# Patient Record
Sex: MALE | Race: WHITE
[De-identification: names, ages, dates, MRNs, and addresses within clinical notes are randomized per-mention and may not be internally consistent; named-entity substitution may affect disease eponyms.]

---

## 2018-01-26 ENCOUNTER — HOSPITAL ENCOUNTER (EMERGENCY)
Dept: HOSPITAL 45 - C.EDB | Age: 23
Discharge: HOME | End: 2018-01-26
Payer: COMMERCIAL

## 2018-01-26 VITALS
TEMPERATURE: 98.42 F | HEART RATE: 84 BPM | DIASTOLIC BLOOD PRESSURE: 69 MMHG | SYSTOLIC BLOOD PRESSURE: 121 MMHG | OXYGEN SATURATION: 97 %

## 2018-01-26 VITALS
HEIGHT: 72.99 IN | WEIGHT: 235.01 LBS | WEIGHT: 235.01 LBS | BODY MASS INDEX: 31.15 KG/M2 | BODY MASS INDEX: 31.15 KG/M2 | HEIGHT: 72.99 IN

## 2018-01-26 DIAGNOSIS — L05.01: Primary | ICD-10-CM

## 2018-01-26 DIAGNOSIS — F32.9: ICD-10-CM

## 2018-01-26 DIAGNOSIS — F41.9: ICD-10-CM

## 2018-01-26 DIAGNOSIS — F12.90: ICD-10-CM

## 2018-01-26 NOTE — EMERGENCY ROOM VISIT NOTE
ED Visit Note


First contact with patient:  14:57


CHIEF COMPLAINT: "I have an abscess right above my ass crack"





HISTORY OF PRESENT ILLNESS:  This 22-year-old male patient presents to the 

emergency department "a few weeks" after they noticed a hard, red, tender area 

"right above my ass crack".  It is slowly getting larger, more painful and 

tender.  The patient states he had the abscess drained approximately 6 months 

ago.  He states a few weeks ago, he noticed it worsening.  He states it has 

been "exponentially growing, and bilateral mean it is increasing in size daily"

.  No fever, chills, or loss of appetite.  There has been no drainage from the 

area.  There was no injury to the area preceding the infection.  They rate the 

pain as minimal and 3/10.  Tetanus shot is up to date.  They have tried 

nothing.  The patient is not diabetic. The patient has history of subcutaneous 

abscesses in the same area.





REVIEW OF SYSTEMS:     A 10 system review of systems was performed with 

positives and pertinent negatives listed in the history of present illness. All 

other systems were reviewed and are negative. 





ALLERGIES: None





MEDICATIONS: None





PMH: None





SOCIAL HISTORY: The patient admits to smoking marijuana.  He denies alcohol or 

tobacco use.  He lives locally with family.





PHYSICAL EXAM: Vital Signs: Reviewed Nurse's notes, vital signs stable.  GENERAL

: This is a 22-year-old white male, no acute distress, non toxic in appearance, 

well-developed well-nourished.  SKIN: There is an erythematous indurated area 

in the sacrococcygeal area, just superior to the buttocks which measures about 

2 cm in diameter.  It is fluctuant but there is no pointing or drainage.  There 

is a zone of inflammation around it but no lymphangitis.  Capillary refill less 

than 2 seconds.  MUSCULOSKELETAL:  There is no limitation of the range of 

motion of the low back.





EMERGENCY DEPARTMENT COURSE:  I examined the patient.  Verbal consent was 

obtained to perform the procedure.  The procedure was performed by our PA 

student under my direct supervision. After saline and Betadine cleansing and 8 

mL of 1% buffered lidocaine anesthesia, the abscess was incised with a number 

11 scalpel blade.  A large amount of purulent material was released with more 

expressed by pressure.  A swab was obtained for culture.  The abscess cavity 

was further probed with a needle  and the deep pocket expressed. The 

abscess cavity was then copiously irrigated with sterile saline under pressure.

  The area was then packed with bacitracin soaked packing.  The area was 

cleaned with sterile saline and dressed with bacitracin and a bulky bandage.  

The patient tolerated the procedure well.  The patient was discharged home in 

stable condition.   


 


I attest that I have personally reviewed the patient's current medication list. 


Patient was found to have normal blood pressure on screening and does not 

require follow-up. 





DIFFERENTIAL DIAGNOSIS: abscess, cellulitis, pilonidal cyst, malignancy, and 

others





DIAGNOSIS:  Pilonidal abscess


Problem List


Medical Problems:


(1) Anxiety


Status: Chronic  





(2) Depression


Status: Chronic  











Current/Historical Medications


Scheduled PRN


Zolpidem Tartrate (Zolpidem Tartrate), 5 MG PO HS PRN for Sleep





Allergies


Coded Allergies:  


     No Known Allergies (Unverified , 11/3/17)





Vital Signs











  Date Time  Temp Pulse Resp B/P (MAP) Pulse Ox O2 Delivery O2 Flow Rate FiO2


 


1/26/18 14:51 36.9 84 18 121/69 97 Room Air  











Medications Administered











 Medications


  (Trade)  Dose


 Ordered  Sig/Bushra


 Route  Start Time


 Stop Time Status Last Admin


Dose Admin


 


 Ketorolac


 Tromethamine


  (Toradol Inj)  60 mg  NOW  STAT


 IM  1/26/18 16:01


 1/26/18 16:02 DC 1/26/18 16:01


60 MG











Departure Information


Impression





 Primary Impression:  


 Pilonidal cyst with abscess





Dispostion


Home / Self-Care





Condition


GOOD





Referrals


Nanci Isidro PA-C (PCP)








Claudio Holguin M.D.





Patient Instructions


ED Abscess IandD, ED Ann Anal Abscess IandD, My [facility]





Additional Instructions





You were seen in the emergency department today for a pilonidal abscess.  This 

was successfully incised and drained.





Keep the area clean and dry.  There is packing in place in the wound.  Do not 

remove the packing.  Please avoid getting the area wet.  The wound covered with 

a dry bandage.





Ibuprofen(Motrin, Advil) may be used for fever or pain.  Use 600mg every six 

hours as needed.  Take with food.  Avoid using more than 2400mg in a 24 hour 

period.  Do not use 2400mg per day for more than three consecutive days without 

physician direction.  Prolonged inappropriate use can lead to stomach upset or 

ulcers. 


(AND/OR)


Acetaminophen(Tylenol) may be used for fever or pain.  Use 1000mg every six 

hours as needed.  Avoid using more than 3000mg in a 24 hour period.  





Return to the emergency department for worsening redness, swelling, significant 

purulent drainage, fever, chills, nausea, vomiting, or significantly worsening 

pain.





Follow-up in 48 hours for packing removal and wound recheck.  This follow-up 

can be at the emergency Department, urgent care, or your primary care provider.





Please follow up with your primary care provider for ongoing management.  As 

discussed, I do recommend follow-up with the general surgeon.

## 2018-01-29 NOTE — PHARMACY PROGRESS NOTE
ED Pharmacist Culture FollowUp


Date of Service:


Jan 29, 2018.


Patient was seen on 1/26/18 for abscess. Patient had I&D at visit and cultures 

obtained. Cultures grew Group B Beta Strep resistant to erythromycin, 

clindamycin, and azithromycin. Patient was not sent with abx and was to follow 

up in 48 hours w/ pcp or ED/urgent care/PCP for removal of packing and wound 

recheck. Discussed with Dr. Hung and no treatment necessary if improved from 

I&D. Called patient ~1500 to see if patient had followed up and had packing 

removed as patient was not seen in the ED. Patient stated they removed the 

packing themselves. Asked if he thought it was improved, stated it was "red and 

bumpy" and he would probably seek more treatment.  I was not able to continue 

the conversation as patient abruptly ended the telephone call stating he was 

really busy and had to go immediately following statement.

## 2018-03-31 ENCOUNTER — HOSPITAL ENCOUNTER (EMERGENCY)
Dept: HOSPITAL 45 - C.EDB | Age: 23
Discharge: HOME | End: 2018-03-31
Payer: COMMERCIAL

## 2018-03-31 VITALS
WEIGHT: 216.93 LBS | BODY MASS INDEX: 28.75 KG/M2 | HEIGHT: 72.99 IN | BODY MASS INDEX: 28.75 KG/M2 | HEIGHT: 72.99 IN | WEIGHT: 216.93 LBS

## 2018-03-31 VITALS — TEMPERATURE: 98.6 F

## 2018-03-31 VITALS — HEART RATE: 75 BPM | DIASTOLIC BLOOD PRESSURE: 59 MMHG | OXYGEN SATURATION: 97 % | SYSTOLIC BLOOD PRESSURE: 111 MMHG

## 2018-03-31 DIAGNOSIS — L05.01: Primary | ICD-10-CM

## 2018-03-31 DIAGNOSIS — F17.210: ICD-10-CM

## 2018-03-31 DIAGNOSIS — S60.221A: ICD-10-CM

## 2018-03-31 DIAGNOSIS — Z86.59: ICD-10-CM
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
## 2018-03-31 NOTE — EMERGENCY ROOM VISIT NOTE
ED Visit Note


First contact with patient:  17:04


CHIEF COMPLAINT:  "There's an abscess on my low back and my right hand hurts."





HISTORY OF PRESENT ILLNESS:  This 22-year-old male patient presents to the 

emergency department, ambulatory, approximately 1 week after they noticed a hard

, red, tender area increasing in size over the tailbone.  The patient does have 

a history of a pilonidal abscess, and states this has been drained 2-3 times 

previously.  It is slowly getting larger, more painful and tender over the past 

week.  No fever, chills, or loss of appetite.  There has been no drainage from 

the area.  There was no injury to the area preceding the infection.  They rate 

the pain as severe and 10/10.  Tetanus shot is up to date.  They have tried no 

OTC remedies.  The patient has not followed up with surgery as previously 

recommended.  The patient is not diabetic. 





The patient also complains of right hand pain which began last night when he 

punched a garbage can.  He states he is having extreme pain over the 4 

metacarpals and digits #2 through 5.  The patient rates the pain as sharp and 10

/10. The patient denies any numbness or tingling. The patient does not have 

injuries to the wrist. The patient has not had a previous fracture to this 

hand.  He states he is unable to  objects or make a claw.  He states the 

injury occurred late last night, and states he just woke up and decided to come 

be evaluated.








REVIEW OF SYSTEMS:     A 10 system review of systems was performed with 

positives and pertinent negatives listed in the history of present illness. All 

other systems were reviewed and are negative. 





ALLERGIES: None





MEDICATIONS: None





PMH: None





SOCIAL HISTORY: The patient lives locally with family.  He denies drug, alcohol 

use.  He admits to smoking 1 pack of cigarettes per day.





PHYSICAL EXAM: Vital Signs: Reviewed Nurse's notes, vital signs stable.  GENERAL

: This is a 22-year-old white male, no acute distress, non toxic in appearance, 

well-developed well-nourished.  SKIN: There is an erythematous indurated area 

in the sacrococcygeal area, just superior to the buttocks which measures about 

2 cm in diameter.  It is fluctuant but there is no pointing or drainage.  There 

is a zone of inflammation around it but no lymphangitis.  Capillary refill less 

than 2 seconds.  MUSCULOSKELETAL:  There is no limitation of the range of 

motion of the low back or lower extremities. There is no obvious deformity of 

the right hand. There is tenderness over the metacarpals and phalanges of 

digits #2 through 5. There is no thenar or hypothenar eminence atrophy.  

Limited thumb opposition to all fingers.  strength 2/5. There is no 

laceration. Capillary refill less than 2 seconds. No tenderness of the fingers 

or wrist. Full range of motion of the wrist. No snuff box tenderness. Radial 

pulse 2+.





RADIOLOGY:


RIGHT HAND 3 VIEWS





CLINICAL HISTORY: Right hand pain. Punching injury.





FINDINGS: 3 views of the right hand are obtained. No prior studies are available


for comparison at the time of dictation. The skeletal structures are well


mineralized. There is minimal angulation of the distal fifth metacarpal with no


acute fracture clearly identified. No additional findings are concerning for


acute fracture. The joint spaces of the hand are well maintained. Dorsal soft


tissue edema is noted.





IMPRESSION:





1. There is mild angulation of the distal fifth metacarpal with no fracture line


clearly identified. This is likely related to age indeterminant posttraumatic


change. Correlate for point tenderness.





2. No additional findings are concerning for acute fracture.





3. Significant dorsal soft tissue edema is observed.











Electronically signed by:  Jacques Rodriguez M.D.


3/31/2018 6:26 PM





Dictated Date/Time:  3/31/2018 6:23 PM





EMERGENCY DEPARTMENT COURSE:  I examined the patient.  X-ray of the right hand 

was performed and reviewed by myself and radiologist as above.  There is 

concerned due to the mechanism and abnormality noted in the fifth metacarpal 

for possible boxer's fracture, and I did recommend Ortho-Glass splint or at 

least a removable splint.  The patient declines these treatments despite 

discussion regarding the risks associated with refusing this treatment.  I did 

encourage close orthopedic follow-up outpatient, and the patient declines this 

as well.  He will be provided with the contact information for orthopedics, and 

was encouraged to contact them for follow-up on Monday.  I did offer to have 

our  schedule the patient an appointment and call him on Monday, 

and the patient declines this as well.





I recommended against incision and drainage again here in the emergency 

department without outpatient follow-up of the pilonidal abscess. I did 

recommend antibiotic treatment at this time with surgery referral outpatient.  

The patient declines and insists that the abscess must be opened and drained 

again.  He is agitated that the abscess continues to return, but I discussed 

with him the importance of follow-up outpatient for final management when there 

is no present infection.  The patient verbalizes understanding, but states he 

is unsure that he will be able to afford the appointments and states he does 

not have time to schedule appointments.





Verbal consent was obtained to perform the procedure.  After saline and 

Betadine cleansing and 4 mL of 1% lidocaine with epinephrine anesthesia, the 

abscess was incised with a number 11 scalpel blade.  A large amount of purulent 

material was released with more expressed by pressure.  The patient declined 

culture.  The abscess cavity was further probed with a needle  and the 

deep pocket expressed. The abscess cavity was then copiously irrigated with 

sterile saline under pressure.  The area was then packed with 1/4 in. packing.  

The area was cleaned with sterile saline and dressed with bacitracin and a 

bulky bandage.  The patient tolerated the procedure well. 





The patient is very concerned regarding cost of his care here in the emergency 

department.  He states he is unable to afford certain tests and treatments due 

to the associated cost.  I did discuss with the patient that I am unaware of 

specific costs of procedures, testing, and treatment, but did discuss with him 

the importance of proper management and care.  The nursing supervisor, Elaine, 

did have a conversation with the patient, and states he was agreeable to workup 

recommended here in the emergency department.  I did discuss with the patient 

the importance of proper outpatient care and did discuss with him multiple 

times that he will be offered and given appropriate treatment for his 

complaints despite the potential costs.





The patient was given his first dose of Augmentin.  I did offer a home pack of 

the medication, the patient declines.  A prescription was sent to the pharmacy.

  Discharge instructions reviewed, the patient was discharged home in good 

condition.


 





I attest that I have personally reviewed the patient's current medication list. 


Patient was found to have normal blood pressure on screening and does not 

require follow-up. 





Etiologies such as soft tissue injury, fracture, dislocation, neurovascular 

compromise, compartment syndrome, abscess, cellulitis, pilonidal cyst, 

malignancy, as well as others were entertained.  








DIAGNOSIS:  Pilonidal cyst with abscess, right hand contusion


Problem List


Medical Problems:


(1) Anxiety


Status: Chronic  





(2) Depression


Status: Chronic  











Current/Historical Medications


Scheduled


Amoxicillin & Pot Clavulanate (Augmentin 875-125 mg), 1 TAB PO BID


Dextromethorphan-Phenylephrine (Vicks Dayquil Cold & Flu), 1 DOSE PO PRN UD





Allergies


Coded Allergies:  


     No Known Allergies (Unverified , 11/3/17)





Vital Signs











  Date Time  Temp Pulse Resp B/P (MAP) Pulse Ox O2 Delivery O2 Flow Rate FiO2


 


3/31/18 19:38  75 18 111/59 97   


 


3/31/18 18:45  80 20 104/70 97 Room Air  


 


3/31/18 16:55 37.0 97 18 127/78 96 Room Air  











Medications Administered











 Medications


  (Trade)  Dose


 Ordered  Sig/Bushra


 Route  Start Time


 Stop Time Status Last Admin


Dose Admin


 


 Amoxicillin/


 Clavulanate


 Potassium


  (Augmentin Tab)  875 mg  NOW  STAT


 PO  3/31/18 17:40


 3/31/18 17:44 DC 3/31/18 17:54


875 MG


 


 Lidocaine/


 Epinephrine


  (Xylocaine/Epine


 1% Inj)  20 ml  NOW  STAT


 INFIL  3/31/18 17:40


 3/31/18 17:44 DC 3/31/18 17:54


20 ML











Departure Information


Impression





 Primary Impression:  


 Pilonidal cyst with abscess


 Additional Impression:  


 Contusion of right hand





Dispostion


Home / Self-Care





Condition


GOOD





Prescriptions





Amoxicillin & Pot Clavulanate (Augmentin 875-125 mg) 1 Tab Tab


1 TAB PO BID for 10 Days, #20 TAB


   Prov: Marie Gonzalez, KURTIS         3/31/18





Referrals


No Doctor, Assigned (PCP)








Minh Santiago M.D., John C., DO





Patient Instructions


ED Cyst Pilonidal Infected IandD, ED Fx Boxer, My [facility]





Additional Instructions





ORTHOPEDIC INSTRUCTIONS:


You were seen in the emergency department today for right hand pain and 

swelling.  There was concern for a possible fifth metacarpal fracture, AKA boxer

's fracture.  I did recommend a splint to immobilize the hand, but you did 

decline.  It is imperative that you follow-up with orthopedics in 2-3 days for 

reevaluation.  I do recommend not using the hand and immobilizing the joints 

until you are seen and evaluated by orthopedics and there is verification of no 

fracture.  As discussed, some fractures do not show up immediately on x-ray.





Ibuprofen(Motrin, Advil) may be used for fever or pain.  Use 600mg every six 

hours as needed.  Take with food.  Avoid using more than 2400mg in a 24 hour 

period.  Do not use 2400mg per day for more than three consecutive days without 

physician direction.  Prolonged inappropriate use can lead to stomach upset or 

ulcers.  


(AND/OR)


Acetaminophen(Tylenol) may be used for fever or pain.  Use 1000mg every six 

hours as needed.  Avoid using more than 3000mg in a 24 hour period.  





Ice compresses for 20 minutes at a time four times daily for 2-3 days.





Rest and elevate your injury.





You may consider an Ace wrap for compression and mild immobilization of the 

hand.





Return to the ER immediately for any numbness, tingling, severe pain, extreme 

swelling, discoloration, or other abnormalities in the extremity or as needed.





Call [facility], [phone], on Monday to arrange follow up for 

your injury.





Follow-up with your primary care physician in 2 to 3 days for a recheck of your 

current condition.  








ABSCESS INSTRUCTIONS:


You were seen in the Emergency Department for Incision and Drainage of the 

pilonidal abscess for the third time. You will NEED to return to the Emergency 

Department or see the general surgeon to have the packing removed/changed in 48 

hours. This packing is NOT dissolvable and WILL need to be removed by a health 

care provider. Try to leave the packing in place until you return to the 

Emergency Department.





You were prescribed Augmentin to be taken twice daily for 10 days. This 

medication is an antibiotic. Stop these medications and contact a medical 

provider if you were to develop any significant adverse side effects including: 

wheezing, shortness of breath, passing out, vomiting, or a diffuse rash. Always 

take antibiotics as directed and COMPLETE the ENTIRE course regardless of the 

improvement of your symptoms.





Proper wound care is essential for adequate wound healing and infection 

prevention. You can shower and clean the wound with soap and water. Do not 

scour over the wound. Pat dry with a towel. Do not submerse the wound (i.e. 

bathe or dish wash) until the sutures have been removed. You can use an 

antibiotic ointment with a dressing over the wound for the next 3-4 days. After 

this time you may leave the wound dry and open to the air. If crust develops 

over the wound you can use a Q-tip to apply a 1:1 peroxide:water solution to 

clean the wound.





Look for signs of infection of the wound including: increased pain, swelling, 

foul discharge, streaking, or increased temperature. If any of these are 

noticed you should return to the Emergency Department for further assessment 

and treatment.





As with any laceration you may have received nerve damage to the surrounding 

tissues. This damage may or may not be permanent.





IT IS IMPERATIVE THAT YOU FOLLOW-UP WITH THE GENERAL SURGEON NEXT WEEK FOR 

FURTHER MANAGEMENT OF THE ABSCESS AND FINALIZED TREATMENT.





Contact Dr. Santiago's office at the number provided ON MONDAY MORNING. If you are 

sent to a switchboard during the call, ask for the GENERAL SURGEON'S OFFICE.





Return to the emergency department if your symptoms worsen despite treatment 

course outlined above.





Problem Qualifiers








 Additional Impression:  


 Contusion of right hand


 Encounter type:  initial encounter  Qualified Codes:  S60.221A - Contusion of 

right hand, initial encounter

## 2018-03-31 NOTE — DIAGNOSTIC IMAGING REPORT
RIGHT HAND 3 VIEWS



CLINICAL HISTORY: Right hand pain. Punching injury.



FINDINGS: 3 views of the right hand are obtained. No prior studies are available

for comparison at the time of dictation. The skeletal structures are well

mineralized. There is minimal angulation of the distal fifth metacarpal with no

acute fracture clearly identified. No additional findings are concerning for

acute fracture. The joint spaces of the hand are well maintained. Dorsal soft

tissue edema is noted.



IMPRESSION:



1. There is mild angulation of the distal fifth metacarpal with no fracture line

clearly identified. This is likely related to age indeterminant posttraumatic

change. Correlate for point tenderness.



2. No additional findings are concerning for acute fracture.



3. Significant dorsal soft tissue edema is observed.







Electronically signed by:  Jacques Rodriguez M.D.

3/31/2018 6:26 PM



Dictated Date/Time:  3/31/2018 6:23 PM

## 2018-04-20 ENCOUNTER — HOSPITAL ENCOUNTER (OUTPATIENT)
Dept: HOSPITAL 45 - X.SURG | Age: 23
Discharge: HOME | End: 2018-04-20
Attending: SURGERY
Payer: COMMERCIAL

## 2018-04-20 VITALS — TEMPERATURE: 97.52 F

## 2018-04-20 VITALS
BODY MASS INDEX: 28.42 KG/M2 | HEIGHT: 72.99 IN | BODY MASS INDEX: 28.42 KG/M2 | HEIGHT: 72.99 IN | WEIGHT: 214.44 LBS | WEIGHT: 214.44 LBS

## 2018-04-20 VITALS — HEART RATE: 58 BPM | SYSTOLIC BLOOD PRESSURE: 120 MMHG | DIASTOLIC BLOOD PRESSURE: 65 MMHG | OXYGEN SATURATION: 96 %

## 2018-04-20 DIAGNOSIS — Z81.8: ICD-10-CM

## 2018-04-20 DIAGNOSIS — F12.10: ICD-10-CM

## 2018-04-20 DIAGNOSIS — F17.200: ICD-10-CM

## 2018-04-20 DIAGNOSIS — L05.91: Primary | ICD-10-CM

## 2018-04-20 DIAGNOSIS — F31.9: ICD-10-CM

## 2018-04-20 DIAGNOSIS — Z83.3: ICD-10-CM

## 2018-04-20 DIAGNOSIS — Z82.49: ICD-10-CM

## 2018-04-20 RX ADMIN — LIDOCAINE HYDROCHLORIDE AND EPINEPHRINE ONE ML: 10; 10 INJECTION, SOLUTION INFILTRATION; PERINEURAL at 10:12

## 2018-04-20 RX ADMIN — LIDOCAINE HYDROCHLORIDE AND EPINEPHRINE ONE ML: 10; 10 INJECTION, SOLUTION INFILTRATION; PERINEURAL at 10:11

## 2018-04-20 NOTE — ANESTHESIA PROGRESS NT - [facility]
Anesthesia Post Op Note


Date & Time


Apr 20, 2018 at 12:06





Vital Signs


Pain Intensity:  5.0





Vital Signs Past 12 Hours








  Date Time  Temp Pulse Resp B/P (MAP) Pulse Ox O2 Delivery O2 Flow Rate FiO2


 


4/20/18 12:01  58 16 120/65 (83) 96 Room Air  


 


4/20/18 11:27 36.4 55 16 103/63 (76) 97 Room Air  


 


4/20/18 11:11 36.3 47 12 117/72 96 Room Air  


 


4/20/18 10:41 36.2 52 16 115/70 97 Nasal Cannula 4 


 


4/20/18 08:30 36.9 70 16 125/79 (94) 99 Room Air  











Notes


Mental Status:  alert / awake / arousable, participated in evaluation


Pt Amnestic to Procedure:  Yes


Nausea / Vomiting:  adequately controlled


Pain:  adequately controlled


Airway Patency, RR, SpO2:  stable & adequate


BP & HR:  stable & adequate


Hydration State:  stable & adequate


Anesthetic Complications:  no major complications apparent

## 2018-04-20 NOTE — DISCHARGE INSTRUCTIONS-SURGCTR
Discharge Instructions


Date of Service


Apr 20, 2018.





Visit


Reason for Visit:  Pilonidal Cyst





Discharge


Discharge Diagnosis / Problem:  pilonidal cyst





Discharge Goals


Goal(s):  Decrease discomfort, Therapeutic intervention





Activity Recommendations


Activity Limitations:  as noted below


Lifting Limitations:  no more than 25 pounds


Exercise/Sports Limitations:  until after follow-up appointment


Shower/Bathe:  tomorrow


Driving or Machine Use:  resume 3 days after discharge


Avoid long periods of sitting or pressure to her buttocks and sacrum for 6 

weeks after surgery.





Anesthesia


.





Post Anesthesia Instructions:





If you have had General Anesthesia or IV Sedation:





*  Do not drive today.


*  Resume driving when surgeon permits.


*  Do not make important decisions or sign legal documents today.


*  Call surgeon for:





   1.  Temperature elevations greater than 101 degrees F.


   2.  Uncontrollable pain.


   3.  Excessive bleeding.


   4.  Persistent nausea and vomiting.


   5.  Medication intolerance (nausea, vomiting or rash).





*  For nausea and vomiting use only clear liquids such as: tea, soda, bouillon 

until nausea subsides, then gradually increase diet as tolerated.





*  If you have any concerns or questions, call your surgeon's office.  If 

physician is unavailable and it is an emergency, call 911 or go to the nearest 

emergency room.





.





Instructions / Follow-Up


Instructions / Follow-Up


Follow-up in the general surgery clinic in 2 weeks for suture removal.  Call 

sooner with questions or concerns.





Diet Recommendations


Home Diet:  resume previous diet





Procedures


Procedures Performed:  


pilonidal cystectomy





Pending Studies


Studies pending at discharge:  no





Medical Emergencies








.


Who to Call and When:





Medical Emergencies:  If at any time you feel your situation is an emergency, 

please call 911 immediately.





.





Non-Emergent Contact


Non-Emergency issues call your:  Surgeon


Call Non-Emergent contact if:  temperature is above 101.5, your pain is not 

controlled, your pain is worsening, wound has increased drainage, wound has 

increased redness, wound has increased pain





.


.








"Provider Documentation" section prepared by Don Amato.








.





[place] Drug Monitoring Program


Search Results:  no issues identified

## 2018-04-20 NOTE — [facility] OPERATIVE REPORT
Operative Report


Operative Date


Apr 20, 2018.





Pre-Operative Diagnosis





Pilonidal Cyst





Post-Operative Diagnosis





Same





Procedure(s) Performed





Pilonidal Cystectomy





Surgeon


Dr. Amato





Assistant Surgeon(s)


MARY Velez PA-C





Estimated Blood Loss


4 ml





Findings


Pilonidal cyst excised, closed in layers





Specimens





A.) Pilonidal Cyst





Drains


None





Anesthesia Type


General





Complication(s)


none





Disposition


no


Recovery Room / PACU





Indications


22-year-old male with history of pilonidal disease requiring multiple incision 

and drainage procedures, plan for pilonidal excision.  The risks of the 

procedure were discussed, all questions were answered, and the patient agreed 

to proceed with surgery as planned.  He understands that there is a high risk 

of wound complications and recurrence, and this is made worse by smoking.





Description of Procedure


The patient was properly identified, consented, and taken to the operating room 

where general endotracheal anesthesia was induced.  He was placed in the prone 

jackknife position.  Preoperative antibiotics were administered.  The patient's 

buttocks were secured with tape to expose the midline cleft.  The patient's 

lower back and buttocks were prepped and draped in the standard sterile 

fashion.  Surgical timeout was performed and all parties were in agreement that 

this was the correct patient and procedure to be performed and we continued as 

planned.  





Local anesthetic was injected in the form of 1% lidocaine with epinephrine 

along the proposed incision.  Methylene blue was injected into the pits.  The 

diseased tissue including all pits and abscess cavities were excised.  The 

diseased tissue was passed off as specimen.  Hemostasis was achieved in the 

wound and the wound was irrigated.  Exparel was injected in the surrounding 

fat.  The wound was then closed in multiple layers using 2-0 Vicryl interrupted 

sutures followed by 3-0 Vicryl interrupted deep dermal sutures.  3-0 nylon 

interrupted simple sutures were then used to close the skin.  Fluff gauze, and 

ABD, and disposable mesh underwear were then applied for the dressing.





The patient was placed back in the supine position and the patient was 

extubated.  The patient was transferred to the PACU for recovery in stable 

condition. All sponge, instrument, and needle counts were correct at the 

conclusion of the procedure. The patient tolerated the procedure well.





The physician's assistant was present and scrubbed for the entirety of the 

case.  He was essential in positioning the patient, prepping and draping, 

retraction and exposure, excision of the pilonidal cyst, closure of the wound, 

and placement of the dressings.


I attest to the content of the Intraoperative Record and any orders documented 

therein.  Any exceptions are noted below.